# Patient Record
Sex: MALE | Employment: STUDENT | URBAN - METROPOLITAN AREA
[De-identification: names, ages, dates, MRNs, and addresses within clinical notes are randomized per-mention and may not be internally consistent; named-entity substitution may affect disease eponyms.]

---

## 2017-08-08 ENCOUNTER — OFFICE VISIT (OUTPATIENT)
Dept: HEMATOLOGY | Age: 32
End: 2017-08-08

## 2017-08-08 ENCOUNTER — HOSPITAL ENCOUNTER (OUTPATIENT)
Dept: LAB | Age: 32
Discharge: HOME | End: 2017-08-08
Payer: SELF-PAY

## 2017-08-08 VITALS
DIASTOLIC BLOOD PRESSURE: 85 MMHG | HEIGHT: 67 IN | OXYGEN SATURATION: 96 % | RESPIRATION RATE: 16 BRPM | HEART RATE: 64 BPM | WEIGHT: 202 LBS | SYSTOLIC BLOOD PRESSURE: 136 MMHG | TEMPERATURE: 97.7 F | BODY MASS INDEX: 31.71 KG/M2

## 2017-08-08 DIAGNOSIS — B19.10 HEP B W/O COMA: ICD-10-CM

## 2017-08-08 DIAGNOSIS — B19.10 HEP B W/O COMA: Primary | ICD-10-CM

## 2017-08-08 LAB
ALBUMIN SERPL BCP-MCNC: 3.8 G/DL (ref 3.4–5)
ALBUMIN/GLOB SERPL: 1 {RATIO} (ref 0.8–1.7)
ALP SERPL-CCNC: 50 U/L (ref 45–117)
ALT SERPL-CCNC: 30 U/L (ref 16–61)
ANION GAP BLD CALC-SCNC: 10 MMOL/L (ref 3–18)
AST SERPL W P-5'-P-CCNC: 22 U/L (ref 15–37)
BASOPHILS # BLD AUTO: 0 K/UL (ref 0–0.06)
BASOPHILS # BLD: 0 % (ref 0–2)
BILIRUB DIRECT SERPL-MCNC: 0.2 MG/DL (ref 0–0.2)
BILIRUB SERPL-MCNC: 0.8 MG/DL (ref 0.2–1)
BUN SERPL-MCNC: 13 MG/DL (ref 7–18)
BUN/CREAT SERPL: 11 (ref 12–20)
CALCIUM SERPL-MCNC: 9 MG/DL (ref 8.5–10.1)
CHLORIDE SERPL-SCNC: 103 MMOL/L (ref 100–108)
CO2 SERPL-SCNC: 29 MMOL/L (ref 21–32)
CREAT SERPL-MCNC: 1.19 MG/DL (ref 0.6–1.3)
DIFFERENTIAL METHOD BLD: ABNORMAL
EOSINOPHIL # BLD: 0.1 K/UL (ref 0–0.4)
EOSINOPHIL NFR BLD: 1 % (ref 0–5)
ERYTHROCYTE [DISTWIDTH] IN BLOOD BY AUTOMATED COUNT: 11.8 % (ref 11.6–14.5)
GLOBULIN SER CALC-MCNC: 3.8 G/DL (ref 2–4)
GLUCOSE SERPL-MCNC: 77 MG/DL (ref 74–99)
HCT VFR BLD AUTO: 43.5 % (ref 36–48)
HGB BLD-MCNC: 15 G/DL (ref 13–16)
LYMPHOCYTES # BLD AUTO: 47 % (ref 21–52)
LYMPHOCYTES # BLD: 3.8 K/UL (ref 0.9–3.6)
MCH RBC QN AUTO: 28.8 PG (ref 24–34)
MCHC RBC AUTO-ENTMCNC: 34.5 G/DL (ref 31–37)
MCV RBC AUTO: 83.5 FL (ref 74–97)
MONOCYTES # BLD: 0.4 K/UL (ref 0.05–1.2)
MONOCYTES NFR BLD AUTO: 5 % (ref 3–10)
NEUTS SEG # BLD: 3.7 K/UL (ref 1.8–8)
NEUTS SEG NFR BLD AUTO: 47 % (ref 40–73)
PLATELET # BLD AUTO: 250 K/UL (ref 135–420)
PMV BLD AUTO: 9.3 FL (ref 9.2–11.8)
POTASSIUM SERPL-SCNC: 4.3 MMOL/L (ref 3.5–5.5)
PROT SERPL-MCNC: 7.6 G/DL (ref 6.4–8.2)
RBC # BLD AUTO: 5.21 M/UL (ref 4.7–5.5)
SODIUM SERPL-SCNC: 142 MMOL/L (ref 136–145)
WBC # BLD AUTO: 8.1 K/UL (ref 4.6–13.2)

## 2017-08-08 PROCEDURE — 82107 ALPHA-FETOPROTEIN L3: CPT | Performed by: NURSE PRACTITIONER

## 2017-08-08 PROCEDURE — 85025 COMPLETE CBC W/AUTO DIFF WBC: CPT | Performed by: NURSE PRACTITIONER

## 2017-08-08 PROCEDURE — 80076 HEPATIC FUNCTION PANEL: CPT | Performed by: NURSE PRACTITIONER

## 2017-08-08 PROCEDURE — 87517 HEPATITIS B DNA QUANT: CPT | Performed by: NURSE PRACTITIONER

## 2017-08-08 PROCEDURE — 36415 COLL VENOUS BLD VENIPUNCTURE: CPT | Performed by: NURSE PRACTITIONER

## 2017-08-08 PROCEDURE — 80048 BASIC METABOLIC PNL TOTAL CA: CPT | Performed by: NURSE PRACTITIONER

## 2017-08-08 RX ORDER — MELATONIN
DAILY
COMMUNITY

## 2017-08-08 NOTE — MR AVS SNAPSHOT
Visit Information Date & Time Provider Department Dept. Phone Encounter #  
 8/8/2017  8:00 AM Citlalli Terry NP Via [address] [id number] Follow-up Instructions Return in about 2 months (around 10/8/2017). Upcoming Health Maintenance Date Due HEMOGLOBIN A1C Q6M 1985 LIPID PANEL Q1 1985 FOOT EXAM Q1 10/18/1995 MICROALBUMIN Q1 10/18/1995 EYE EXAM RETINAL OR DILATED Q1 10/18/1995 Pneumococcal 19-64 Medium Risk (1 of 1 - PPSV23) 10/18/2004 DTaP/Tdap/Td series (1 - Tdap) 10/18/2006 INFLUENZA AGE 9 TO ADULT 8/1/2017 Allergies as of 8/8/2017  Review Complete On: 8/8/2017 By: Memory Heading No Known Allergies Current Immunizations  Never Reviewed No immunizations on file. Not reviewed this visit You Were Diagnosed With   
  
 Codes Comments Hep B w/o coma    -  Primary ICD-10-CM: B19.10 ICD-9-CM: 070.30 Vitals BP Pulse Temp Resp Height(growth percentile) 136/85 (BP 1 Location: Left arm, BP Patient Position: Sitting) 64 97.7 °F (36.5 °C) (Tympanic) 16 5' 7\" (1.702 m) Weight(growth percentile) SpO2 BMI Smoking Status 202 lb (91.6 kg) 96% 31.64 kg/m2 Never Smoker Vitals History BMI and BSA Data Body Mass Index Body Surface Area  
 31.64 kg/m 2 2.08 m 2 Your Updated Medication List  
  
   
This list is accurate as of: 8/8/17  8:23 AM.  Always use your most recent med list.  
  
  
  
  
 levothyroxine 25 mcg tablet Commonly known as:  SYNTHROID Take 50 mcg by mouth Daily (before breakfast). metFORMIN 500 mg tablet Commonly known as:  GLUCOPHAGE Take  by mouth two (2) times daily (with meals). VITAMIN D3 1,000 unit tablet Generic drug:  cholecalciferol Take  by mouth daily. Follow-up Instructions Return in about 2 months (around 10/8/2017). To-Do List   
 08/08/2017 Lab:  AFP WITH AFP-L3% 08/08/2017 Lab:  CBC WITH AUTOMATED DIFF   
  
 08/08/2017 Lab:  HEPATIC FUNCTION PANEL   
  
 08/08/2017 Lab:  HEPATITIS B, QT, PCR   
  
 08/08/2017 Lab:  METABOLIC PANEL, BASIC   
  
 08/08/2017 Imaging:  [facility] [employer] Introducing [facility]! Miesha Ramirez introduces [employer] patient portal. Now you can access parts of your medical record, email your doctor's office, and request medication refills online. 1. In your internet browser, go to [email or web address]. [employer] 2. Click on the First Time User? Click Here link in the Sign In box. You will see the New Member Sign Up page. 3. Enter your [employer] Access Code exactly as it appears below. You will not need to use this code after youve completed the sign-up process. If you do not sign up before the expiration date, you must request a new code. · [employer] Access Code: [email or web address] Expires: 11/6/2017  8:23 AM 
 
4. Enter the last four digits of your Social Security Number (xxxx) and Date of Birth (mm/dd/yyyy) as indicated and click Submit. You will be taken to the next sign-up page. 5. Create a [employer] ID. This will be your [employer] login ID and cannot be changed, so think of one that is secure and easy to remember. 6. Create a [employer] password. You can change your password at any time. 7. Enter your Password Reset Question and Answer. This can be used at a later time if you forget your password. 8. Enter your e-mail address. You will receive e-mail notification when new information is available in [address] 9. Click Sign Up. You can now view and download portions of your medical record. 10. Click the Download Summary menu link to download a portable copy of your medical information. If you have questions, please visit the Frequently Asked Questions section of the [employer] website. Remember, [employer] is NOT to be used for urgent needs. For medical emergencies, dial 911. Now available from your iPhone and Android! Please provide this summary of care documentation to your next provider. Your primary care clinician is listed as TANYA BRENNAN. If you have any questions after today's visit, please call [phone].

## 2017-08-08 NOTE — PROGRESS NOTES
[address] Mcclain MD, [address], [facility], [place]       LUCIO Babcock Cera, PA-C Dolph Richard, NP Teddie Paling, NP        at [address], [address] [place]  [address].     [phone]     FAX: [phone]    at [address], [address] [facility], [address]     [phone]     FAX: [phone]         Patient Care Team:  Yesi Perry MD as PCP - General (Internal Medicine)      Problem List  Date Reviewed: 8/8/2017          Codes Class Noted    Chronic hepatitis B virus infection [facility]) ICD-10-CM: B18.1  ICD-9-CM: 070.32  11/21/2016        Acquired hypothyroidism ICD-10-CM: E03.9  ICD-9-CM: 244.9  11/21/2016        Diabetes mellitus type 2, controlled ([facility] [address] ICD-10-CM: E11.9  ICD-9-CM: 250.00  11/21/2016                Adeline Newman in consultation regarding elevated liver enzymes and its management. He has inactive hepatitis B. The patient is a 32 y.o.  male who was first noted to have abnormalities in liver transaminases in 2012. Imaging of the liver demonstrates fatty liver. There was a small hepatic lesion noted, benign on follow up MRI. Elastography shows normal to mild fibrosis. He continues to lose weight and has lost 25 pounds so far. He visited [facility] in 04/2016 to see family and had a bad cough and told he has pertussis. The cough lingered followed by dizziness, and weakness. 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 He saw his student juancho provider, Dr. Blanco Hodges, who diagnosed him with diabetes and hypothyroidism.     The most recent laboratory studies indicate that the liver transaminases are elevated, alkaline phosphatase is normal, tests of hepatic synthetic and metabolic function are normal, and the platelet count is normal.      He is on Metformin, but is trying to lose enough weight to come off. He does complain of fatigue, which is severe at times. The patient completes all daily activities without any functional limitations. The patient has not experienced fevers, chills, shortness of breath, chest pain, pain in the right side over the liver, diffuse abdominal pain, nausea, vomiting, constipation, diarrhrea, dry eyes, dry mouth, arthralgias, myalgias, yellowing of the eyes or skin, itching, dark urine, problems concentrating, swelling of the abdomen, swelling of the lower extremities, hematemesis, or hematochezia. ALLERGIES  No Known Allergies    MEDICATIONS  Current Outpatient Prescriptions   Medication Sig    cholecalciferol (VITAMIN D3) 1,000 unit tablet Take  by mouth daily.  metFORMIN (GLUCOPHAGE) 500 mg tablet Take  by mouth two (2) times daily (with meals).  levothyroxine (SYNTHROID) 25 mcg tablet Take 50 mcg by mouth Daily (before breakfast). No current facility-administered medications for this visit. SYSTEM REVIEW NOT RELATED TO LIVER DISEASE OR REVIEWED ABOVE:  Constitution systems: Negative for fever, chills, weight gain, weight loss. Eyes: Negative for visual changes. ENT: Negative for sore throat, painful swallowing. Respiratory: Negative for cough, hemoptysis, SOB. Cardiology: Negative for chest pain, palpitations. GI:  Negative for constipation or diarrhea. : Negative for urinary frequency, dysuria, hematuria, nocturia. Skin: Negative for rash. Hematology: Negative for easy bruising, blood clots. Musculo-skelatal: Negative for back pain, muscle pain, weakness. Neurologic: Negative for headaches, dizziness, vertigo, memory problems not related to HE. Psychology: Negative for anxiety, depression. FAMILY HISTORY:  The father is  age 48. The mother is alive. She had a CVA. She is 63. She has DM. No family history of liver disease    SOCIAL HISTORY:  The patient has never been .     The patient has no children. The patient has never used tobacco products. The patient has never consumed significant amounts of alcohol. 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    PHYSICAL EXAMINATION:  Visit Vitals    /85 (BP 1 Location: Left arm, BP Patient Position: Sitting)    Pulse 64    Temp 97.7 °F (36.5 °C) (Tympanic)    Resp 16    Ht 5' 7\" (1.702 m)    Wt 202 lb (91.6 kg)    SpO2 96%    BMI 31.64 kg/m2     General: No acute distress. Eyes: Sclera anicteric. ENT: No oral lesions. Thyroid normal.  Nodes: No adenopathy. Skin: No spider angiomata. No jaundice. No palmar erythema. Respiratory: Lungs clear to auscultation. Cardiovascular: Regular heart rate. No murmurs. No JVD. Abdomen: Soft non-tender. Liver size normal to percussion/palpation. Spleen not palpable. No obvious ascites. Extremities: No edema. No muscle wasting. No gross arthritic changes. Neurologic: Alert and oriented. Cranial nerves grossly intact. No asterixis.     LABORATORY STUDIES:  [facility] Ref Rng 11/21/2016   WBC 4.6 - 13.2 K/uL 7.0   ANC 1.8 - 8.0 K/UL 3.8   HGB 13.0 - 16.0 g/dL 16.2 (H)    - 420 K/uL 271   INR 0.8 - 1.2   1.0   AST 15 - 37 U/L 39 (H)   ALT 16 - 61 U/L 71 (H)   Alk Phos 45 - 117 U/L 80   Bili, Total 0.2 - 1.0 MG/DL 0.4   Bili, Direct 0.0 - 0.2 MG/DL 0.2   Albumin 3.4 - 5.0 g/dL 4.3   BUN 7.0 - 18 MG/DL 14   Creat 0.6 - 1.3 MG/DL 1.25   Na 136 - 145 mmol/L 142   K 3.5 - 5.5 mmol/L 3.9   Cl 100 - 108 mmol/L 104   CO2 21 - 32 mmol/L 27   Glucose 74 - 99 mg/dL 74     09/2016  From 9/2016  AST/ALT/ALP/T Bili/ALB: 36/58/74/0.5/3.9    SEROLOGIES:  [facility] Latest Ref Rng 11/21/2016   Hep C Ab 0.0 - 0.9 s/co ratio <0.1   Ferritin 8 - 388 NG/   Iron % Saturation % 22   ASMCA 0 - 19 Units 18   M2 Ab 0.0 - 20.0 Units 9.0   Ceruloplasmin 16.0 - 31.0 mg/dL 25.2     HFE (-)    Ferritin 334, iron sat 29%  Hgb A1C 6.9  HBV quant 115,  3/2015     LIVER HISTOLOGY:  12/2016. Shear wave elastography. E Median is 8.11 kPa, suggestive of Metavir score of F1/F2. E Std is 1.84 kPa, suggestive of fatty liver. ENDOSCOPIC PROCEDURES:  Not available or performed    RADIOLOGY:  2011- US: Increased hepatic heterogeneity. 12/ 2016. Ultrasound of the liver, performed with elastography. LIVER: Liver is normal in size, largest transverse dimension of the right hepatic lobe measuring 15.3 cm. Moderate, diffusely increased hepatic echotexture. Hypoechoic lesion along the medial aspect of the right hepatic lobe near the gallbladder fossa measures 0.7 x 1.0 x 0.9 cm.   12/2016. Abdominal MRI w/wo contrast. Normal liver contour. No clear correlate for hypoechoic lesion near the gallbladder fossa on prior ultrasound . OTHER TESTING:  Not available or performed    ASSESSMENT AND PLAN:  Intermitant elevation in liver transaminases likely due to fatty liver given obesity and diabetes. The most recent laboratory studies indicate that the liver transaminases are elevated, alkaline phosphatase is normal, tests of hepatic synthetic and metabolic function are normal, and the platelet count is normal.  Will perform laboratory testing to monitor liver function and degree of liver injury. This will include hepatic panel, a CBC w/ diff, a BMP, an AFP-L3%, and a HBV DNA. He has inactive Hepatitis B. Inactive disease does not need to be treated. His Ferritin and iron saturation are not concerning. HFE genetics are wnl. Elastography for fibrosis assessment is normal to mild fibrosis. Repeat ultrasound was ordered today. Serologic evaluation was negative for all causes of chronic liver disease. The need for vaccination against viral hepatitis A  will be assessed with serologic and instituted as appropriate. We discussed weight loss efforts. The patient was counseled regarding diet and exercise to achieve weight loss.  The best diet for patients with fatty liver is one very low in carbohydrates and enriched with protein such as an [employer].  He has already lost 25 pounds. All of the above issues were discussed with the patient. All questions were answered. The patient expressed a clear understanding of the above. [address] [id number] in 2 months with Dr. Christopher Knox.         Chris Conn NP   [facility] [place] [facility] [address] [place] [address], [address] [place] [address]   [phone]

## 2017-08-09 LAB
AFP L3 MFR SERPL: NORMAL % (ref 0–9.9)
AFP SERPL-MCNC: 3.9 NG/ML (ref 0–8)
HBV DNA SERPL NAA+PROBE-ACNC: 410 IU/ML
HBV DNA SERPL NAA+PROBE-LOG IU: 2.61 LOG10IU/ML
TEST INFORMATION: NORMAL

## 2017-08-18 ENCOUNTER — HOSPITAL ENCOUNTER (OUTPATIENT)
Dept: ULTRASOUND IMAGING | Age: 32
Discharge: HOME | End: 2017-08-18
Payer: COMMERCIAL

## 2017-08-18 DIAGNOSIS — B19.10 HEP B W/O COMA: ICD-10-CM

## 2017-08-18 PROCEDURE — 76705 ECHO EXAM OF ABDOMEN: CPT

## 2017-08-23 ENCOUNTER — TELEPHONE (OUTPATIENT)
Dept: HEMATOLOGY | Age: 32
End: 2017-08-23

## 2017-08-23 NOTE — TELEPHONE ENCOUNTER
Called patient with lab and US results as per NP Bhupinder Hunter. Pt verbalized understanding and requested results be sent to Dr. Ricki Sanchez. Results were faxed on [id number].

## 2017-08-23 NOTE — TELEPHONE ENCOUNTER
Patient calling to get most recent lab results and US results. Informed patient that message will be sent to LUCIO Reynolds. Patient confirmed understanding. Please contact patient at earliest convenience.

## 2017-08-28 ENCOUNTER — TELEPHONE (OUTPATIENT)
Dept: HEMATOLOGY | Age: 32
End: 2017-08-28